# Patient Record
Sex: FEMALE | Race: WHITE | NOT HISPANIC OR LATINO | Employment: OTHER | URBAN - METROPOLITAN AREA
[De-identification: names, ages, dates, MRNs, and addresses within clinical notes are randomized per-mention and may not be internally consistent; named-entity substitution may affect disease eponyms.]

---

## 2017-02-14 NOTE — PATIENT DISCUSSION
Pinguecula Counseling:  I have explained to the patient at length the diagnosis of pinguecula and its pathophysiology. I recommended the patient adequately protect their eyes from excessive UV light and dry, lachelle conditions. The use of artificial tears in dry conditions was encouraged. Return for follow-up as scheduled.

## 2017-02-17 ENCOUNTER — PREPPED CHART (OUTPATIENT)
Dept: URBAN - METROPOLITAN AREA CLINIC 39 | Facility: CLINIC | Age: 62
End: 2017-02-17

## 2018-09-12 ENCOUNTER — ESTABLISHED COMPREHENSIVE EXAM (OUTPATIENT)
Dept: URBAN - METROPOLITAN AREA CLINIC 39 | Facility: CLINIC | Age: 63
End: 2018-09-12

## 2018-09-12 DIAGNOSIS — H43.393: ICD-10-CM

## 2018-09-12 DIAGNOSIS — H25.813: ICD-10-CM

## 2018-09-12 DIAGNOSIS — H40.053: ICD-10-CM

## 2018-09-12 DIAGNOSIS — H35.3131: ICD-10-CM

## 2018-09-12 DIAGNOSIS — G43.B1: ICD-10-CM

## 2018-09-12 PROCEDURE — 4177F TALK PT/CRGVR RE AREDS PREV: CPT

## 2018-09-12 PROCEDURE — 2019F DILATED MACUL EXAM DONE: CPT

## 2018-09-12 PROCEDURE — G9974 MAC EXAM PERF: HCPCS

## 2018-09-12 PROCEDURE — G8427 DOCREV CUR MEDS BY ELIG CLIN: HCPCS

## 2018-09-12 PROCEDURE — 92310-1 LEVEL 1 CONTACT LENS MANAGEMENT

## 2018-09-12 PROCEDURE — 92014 COMPRE OPH EXAM EST PT 1/>: CPT

## 2018-09-12 PROCEDURE — 92015 DETERMINE REFRACTIVE STATE: CPT

## 2018-09-12 PROCEDURE — 1036F TOBACCO NON-USER: CPT

## 2018-09-12 PROCEDURE — G9903 PT SCRN TBCO ID AS NON USER: HCPCS

## 2018-09-12 ASSESSMENT — TONOMETRY
OS_IOP_MMHG: 13
OD_IOP_MMHG: 16

## 2018-09-12 ASSESSMENT — VISUAL ACUITY
OS_SC: J1
OD_SC: 20/25-1
OD_SC: J1
OS_SC: 20/30-2

## 2019-08-16 ENCOUNTER — ESTABLISHED COMPREHENSIVE EXAM (OUTPATIENT)
Dept: URBAN - METROPOLITAN AREA CLINIC 39 | Facility: CLINIC | Age: 64
End: 2019-08-16

## 2019-08-16 DIAGNOSIS — H35.3131: ICD-10-CM

## 2019-08-16 DIAGNOSIS — H43.393: ICD-10-CM

## 2019-08-16 DIAGNOSIS — H40.053: ICD-10-CM

## 2019-08-16 DIAGNOSIS — G43.B1: ICD-10-CM

## 2019-08-16 DIAGNOSIS — H25.813: ICD-10-CM

## 2019-08-16 DIAGNOSIS — Z97.3: ICD-10-CM

## 2019-08-16 PROCEDURE — 92014 COMPRE OPH EXAM EST PT 1/>: CPT

## 2019-08-16 PROCEDURE — 92015 DETERMINE REFRACTIVE STATE: CPT

## 2019-08-16 PROCEDURE — 92133 CPTRZD OPH DX IMG PST SGM ON: CPT

## 2019-08-16 PROCEDURE — 76514 ECHO EXAM OF EYE THICKNESS: CPT

## 2019-08-16 ASSESSMENT — VISUAL ACUITY
OS_SC: 20/40
OS_CC: J8
OD_SC: J1
OS_SC: J1
OD_SC: 20/60-1
OS_CC: 20/20-1

## 2019-08-16 ASSESSMENT — TONOMETRY
OD_IOP_MMHG: 14
OS_IOP_MMHG: 11

## 2019-09-18 NOTE — PATIENT DISCUSSION
Pinguecula Counseling:  I have explained to the patient the diagnosis of pinguecula and its pathophysiology. I recommended the patient adequately protect their eyes from excessive UV light and dry, lachelle conditions. The use of artificial tears in dry conditions was encouraged. Return for follow-up as scheduled.

## 2019-09-19 NOTE — PATIENT DISCUSSION
Cataract Monitor Counseling:   I have discussed continuing with current spectacles vs updating. Return to follow up as scheduled or sooner if symptoms arise. Rhofade Pregnancy And Lactation Text: This medication has not been assigned a Pregnancy Risk Category. It is unknown if the medication is excreted in breast milk.

## 2020-07-17 NOTE — PATIENT DISCUSSION
Dry Eye Syndrome Counseling: I have discussed the diagnosis and the pathophysiology of this disease with the patient. Eyelid pathology and systemic illnesses such as Sjogren&rsquo;s disease or rheumatoid arthritis may contribute to severity. Vision may be limited by dry eye, and symptoms exacerbated by environmental factors such as smoke, wind, or prolonged eye use. Treatment options include, but are not limited to, artificial tears, punctal plugs, topical cyclosporine, oral omega-3 supplements, Lipiflow, moisture goggles, and lubricating ointments. I stressed the importance of compliance with treatment. Suturegard Body: The suture ends were repeatedly re-tightened and re-clamped to achieve the desired tissue expansion.

## 2020-08-25 ENCOUNTER — ESTABLISHED COMPREHENSIVE EXAM (OUTPATIENT)
Dept: URBAN - METROPOLITAN AREA CLINIC 39 | Facility: CLINIC | Age: 65
End: 2020-08-25

## 2020-08-25 DIAGNOSIS — Z97.3: ICD-10-CM

## 2020-08-25 DIAGNOSIS — H35.3131: ICD-10-CM

## 2020-08-25 DIAGNOSIS — H25.813: ICD-10-CM

## 2020-08-25 DIAGNOSIS — H43.393: ICD-10-CM

## 2020-08-25 DIAGNOSIS — G43.B1: ICD-10-CM

## 2020-08-25 DIAGNOSIS — H40.053: ICD-10-CM

## 2020-08-25 PROCEDURE — 92014 COMPRE OPH EXAM EST PT 1/>: CPT

## 2020-08-25 PROCEDURE — 92133 CPTRZD OPH DX IMG PST SGM ON: CPT

## 2020-08-25 PROCEDURE — 92015 DETERMINE REFRACTIVE STATE: CPT

## 2020-08-25 ASSESSMENT — VISUAL ACUITY
OS_SC: 20/60-2
OU_SC: 20/40-1+2
OD_SC: 20/40-2
OD_SC: J1+
OU_SC: J1+
OS_SC: J1+

## 2020-08-25 ASSESSMENT — TONOMETRY
OD_IOP_MMHG: 15
OS_IOP_MMHG: 14

## 2021-08-02 NOTE — PATIENT DISCUSSION
spherecylinderaxisaddprismvertexVAInt VANVExaminer reading glasses - OTC/Normal vision: sees adequately in most situations; can see medication labels, newsprint

## 2021-08-02 NOTE — PATIENT DISCUSSION
PATIENT TO RETURN BACK FOR UNDILATED TRIAL FRAME AND CHECK GLARE, IF GLARE IS PRESENT PATIENT TO SCHEDULE SURGERY.

## 2021-08-02 NOTE — PATIENT DISCUSSION
CATARACTS, OU -   PT TO CONSIDER SURGERY IF GLARE IS STILL PRESENT WITH TRIAL FRAME. VISION IMPROVES WITH MRX TODAY. GIVEN OPTION OF RETURNING FOR TRIAL FRAME UNDILATED VS FILLING GLASSES PRESCRIPTION AND RETURN IF GLARE IS STILL PRESENT AND BOTHERSOME.

## 2021-08-03 NOTE — PATIENT DISCUSSION
PATIENT FELT VISION IMPROVED WITH THE TRIAL FRAME IN THE RIGHT EYE. PATIENT STILL NOTICED GLARE BUT PREFERS THE MOST CONSERVATIVE APPROACH WOULD BE TO FILL GLASSES PRESCRIPTION FIRST AND WILL CONSIDER SURGERY IF VISION OR GLARE IS STILL BOTHERSOME.

## 2021-08-26 ENCOUNTER — ESTABLISHED COMPREHENSIVE EXAM (OUTPATIENT)
Dept: URBAN - METROPOLITAN AREA CLINIC 39 | Facility: CLINIC | Age: 66
End: 2021-08-26

## 2021-08-26 DIAGNOSIS — H35.3131: ICD-10-CM

## 2021-08-26 DIAGNOSIS — H43.393: ICD-10-CM

## 2021-08-26 DIAGNOSIS — Z97.3: ICD-10-CM

## 2021-08-26 DIAGNOSIS — H25.813: ICD-10-CM

## 2021-08-26 DIAGNOSIS — H40.053: ICD-10-CM

## 2021-08-26 DIAGNOSIS — G43.B1: ICD-10-CM

## 2021-08-26 PROCEDURE — 92014 COMPRE OPH EXAM EST PT 1/>: CPT

## 2021-08-26 PROCEDURE — 92133 CPTRZD OPH DX IMG PST SGM ON: CPT

## 2021-08-26 PROCEDURE — 92015 DETERMINE REFRACTIVE STATE: CPT

## 2021-08-26 ASSESSMENT — VISUAL ACUITY
OD_SC: J1
OS_SC: 20/40
OS_SC: J1
OD_SC: 20/70-1

## 2021-08-26 ASSESSMENT — TONOMETRY
OS_IOP_MMHG: 16
OD_IOP_MMHG: 15

## 2022-08-25 ENCOUNTER — COMPREHENSIVE EXAM (OUTPATIENT)
Dept: URBAN - METROPOLITAN AREA CLINIC 39 | Facility: CLINIC | Age: 67
End: 2022-08-25

## 2022-08-25 DIAGNOSIS — H40.053: ICD-10-CM

## 2022-08-25 DIAGNOSIS — H25.813: ICD-10-CM

## 2022-08-25 DIAGNOSIS — H43.393: ICD-10-CM

## 2022-08-25 DIAGNOSIS — Z97.3: ICD-10-CM

## 2022-08-25 DIAGNOSIS — H35.3131: ICD-10-CM

## 2022-08-25 DIAGNOSIS — G43.B1: ICD-10-CM

## 2022-08-25 PROCEDURE — 92015 DETERMINE REFRACTIVE STATE: CPT

## 2022-08-25 PROCEDURE — 92133 CPTRZD OPH DX IMG PST SGM ON: CPT

## 2022-08-25 PROCEDURE — 92014 COMPRE OPH EXAM EST PT 1/>: CPT

## 2022-08-25 ASSESSMENT — VISUAL ACUITY
OD_SC: 20/40-2
OU_CC: 20/25+2 CL
OS_CC: 20/25+1 CL
OS_CC: J5 CL
OD_SC: J1+

## 2022-08-25 ASSESSMENT — TONOMETRY
OD_IOP_MMHG: 14
OS_IOP_MMHG: 13

## 2023-01-06 NOTE — PATIENT DISCUSSION
Elevated IOP OS today. OCT RNFL shows not thinning. Schedule HVF 24-2 at earliest convenience. IOP check next available.

## 2023-01-13 NOTE — PATIENT DISCUSSION
I have explained to the patient at length the diagnosis of ocular hypertension and its pathophysiology. The presence of elevated intraocular pressure without detectable visual field loss and/or optic nerve damage was discussed with the patient. I have discussed the risks, benefits, and alternatives of treatment as well as the risk factors for glaucoma. The patient understands and agrees with close observation. Return for follow-up as scheduled.

## 2023-08-28 ENCOUNTER — COMPREHENSIVE EXAM (OUTPATIENT)
Dept: URBAN - METROPOLITAN AREA CLINIC 39 | Facility: CLINIC | Age: 68
End: 2023-08-28

## 2023-08-28 DIAGNOSIS — H40.053: ICD-10-CM

## 2023-08-28 DIAGNOSIS — H35.3131: ICD-10-CM

## 2023-08-28 DIAGNOSIS — Z97.3: ICD-10-CM

## 2023-08-28 DIAGNOSIS — G43.B1: ICD-10-CM

## 2023-08-28 DIAGNOSIS — H25.813: ICD-10-CM

## 2023-08-28 DIAGNOSIS — H43.393: ICD-10-CM

## 2023-08-28 PROCEDURE — 92015 DETERMINE REFRACTIVE STATE: CPT

## 2023-08-28 PROCEDURE — 92133 CPTRZD OPH DX IMG PST SGM ON: CPT

## 2023-08-28 PROCEDURE — 92014 COMPRE OPH EXAM EST PT 1/>: CPT

## 2023-08-28 PROCEDURE — 92310-1 LEVEL 1 CONTACT LENS MANAGEMENT

## 2023-08-28 ASSESSMENT — VISUAL ACUITY
OS_CC: J7
OU_CC: J1+
OD_SC: 20/40-1
OS_CC: 20/25-1
OU_CC: 20/20-1
OD_SC: J1+

## 2023-08-28 ASSESSMENT — TONOMETRY
OD_IOP_MMHG: 15
OS_IOP_MMHG: 14

## 2024-08-29 ENCOUNTER — COMPREHENSIVE EXAM (OUTPATIENT)
Dept: URBAN - METROPOLITAN AREA CLINIC 39 | Facility: CLINIC | Age: 69
End: 2024-08-29

## 2024-08-29 DIAGNOSIS — H35.3131: ICD-10-CM

## 2024-08-29 DIAGNOSIS — H25.813: ICD-10-CM

## 2024-08-29 DIAGNOSIS — H02.881: ICD-10-CM

## 2024-08-29 DIAGNOSIS — H40.053: ICD-10-CM

## 2024-08-29 DIAGNOSIS — H04.123: ICD-10-CM

## 2024-08-29 DIAGNOSIS — H43.393: ICD-10-CM

## 2024-08-29 DIAGNOSIS — Z97.3: ICD-10-CM

## 2024-08-29 DIAGNOSIS — H02.884: ICD-10-CM

## 2024-08-29 DIAGNOSIS — G43.B1: ICD-10-CM

## 2024-08-29 PROCEDURE — 92133 CPTRZD OPH DX IMG PST SGM ON: CPT

## 2024-08-29 PROCEDURE — 92014 COMPRE OPH EXAM EST PT 1/>: CPT

## 2024-08-29 ASSESSMENT — VISUAL ACUITY
OD_SC: 20/30
OD_SC: J1
OS_CC: J3
OU_CC: 20/20
OS_CC: 20/20-1
OU_CC: J1+

## 2024-08-29 ASSESSMENT — TONOMETRY
OD_IOP_MMHG: 14
OS_IOP_MMHG: 12